# Patient Record
Sex: FEMALE | URBAN - METROPOLITAN AREA
[De-identification: names, ages, dates, MRNs, and addresses within clinical notes are randomized per-mention and may not be internally consistent; named-entity substitution may affect disease eponyms.]

---

## 2019-01-16 ENCOUNTER — OFFICE VISIT (OUTPATIENT)
Dept: URBAN - METROPOLITAN AREA CLINIC 58 | Facility: CLINIC | Age: 59
End: 2019-01-16
Payer: COMMERCIAL

## 2019-01-16 DIAGNOSIS — H25.13 AGE-RELATED NUCLEAR CATARACT, BILATERAL: Primary | ICD-10-CM

## 2019-01-16 DIAGNOSIS — H52.4 PRESBYOPIA: ICD-10-CM

## 2019-01-16 DIAGNOSIS — H40.013 OPEN ANGLE WITH BORDERLINE FINDINGS, LOW RISK, BILATERAL: ICD-10-CM

## 2019-01-16 PROCEDURE — 92004 COMPRE OPH EXAM NEW PT 1/>: CPT | Performed by: OPTOMETRIST

## 2019-01-16 ASSESSMENT — INTRAOCULAR PRESSURE
OS: 12
OD: 11

## 2019-01-16 ASSESSMENT — KERATOMETRY
OD: 43.00
OS: 42.75

## 2019-01-16 ASSESSMENT — VISUAL ACUITY
OD: 20/20
OS: 20/20

## 2019-01-16 NOTE — IMPRESSION/PLAN
Impression: Age-related nuclear cataract, bilateral: H25.13. Plan: Discussed diagnosis in detail with patient. No treatment is required at this time. Will continue to observe condition and or symptoms. Call if [address] worsens. New glasses prescription needed.